# Patient Record
Sex: FEMALE | Race: WHITE | Employment: FULL TIME
[De-identification: names, ages, dates, MRNs, and addresses within clinical notes are randomized per-mention and may not be internally consistent; named-entity substitution may affect disease eponyms.]

---

## 2017-09-17 ENCOUNTER — HEALTH MAINTENANCE LETTER (OUTPATIENT)
Age: 47
End: 2017-09-17

## 2019-04-21 ENCOUNTER — HOSPITAL ENCOUNTER (EMERGENCY)
Facility: CLINIC | Age: 49
Discharge: HOME | End: 2019-04-21
Attending: EMERGENCY MEDICINE | Admitting: EMERGENCY MEDICINE
Payer: COMMERCIAL

## 2019-04-21 ENCOUNTER — APPOINTMENT (OUTPATIENT)
Dept: GENERAL RADIOLOGY | Facility: CLINIC | Age: 49
End: 2019-04-21
Attending: EMERGENCY MEDICINE
Payer: COMMERCIAL

## 2019-04-21 VITALS
RESPIRATION RATE: 16 BRPM | SYSTOLIC BLOOD PRESSURE: 129 MMHG | TEMPERATURE: 98.2 F | WEIGHT: 170 LBS | BODY MASS INDEX: 29.02 KG/M2 | OXYGEN SATURATION: 99 % | HEIGHT: 64 IN | DIASTOLIC BLOOD PRESSURE: 102 MMHG | HEART RATE: 60 BPM

## 2019-04-21 DIAGNOSIS — R07.9 CHEST PAIN, UNSPECIFIED TYPE: ICD-10-CM

## 2019-04-21 LAB
ANION GAP SERPL CALCULATED.3IONS-SCNC: 7 MMOL/L (ref 3–14)
BASOPHILS # BLD AUTO: 0 10E9/L (ref 0–0.2)
BASOPHILS NFR BLD AUTO: 0.4 %
BUN SERPL-MCNC: 17 MG/DL (ref 7–30)
CALCIUM SERPL-MCNC: 8.5 MG/DL (ref 8.5–10.1)
CHLORIDE SERPL-SCNC: 109 MMOL/L (ref 94–109)
CO2 SERPL-SCNC: 25 MMOL/L (ref 20–32)
CREAT SERPL-MCNC: 0.62 MG/DL (ref 0.52–1.04)
DIFFERENTIAL METHOD BLD: NORMAL
EOSINOPHIL # BLD AUTO: 0.2 10E9/L (ref 0–0.7)
EOSINOPHIL NFR BLD AUTO: 3.4 %
ERYTHROCYTE [DISTWIDTH] IN BLOOD BY AUTOMATED COUNT: 12.9 % (ref 10–15)
GFR SERPL CREATININE-BSD FRML MDRD: >90 ML/MIN/{1.73_M2}
GLUCOSE SERPL-MCNC: 88 MG/DL (ref 70–99)
HCT VFR BLD AUTO: 40.4 % (ref 35–47)
HGB BLD-MCNC: 13.8 G/DL (ref 11.7–15.7)
IMM GRANULOCYTES # BLD: 0 10E9/L (ref 0–0.4)
IMM GRANULOCYTES NFR BLD: 0.1 %
INTERPRETATION ECG - MUSE: NORMAL
LYMPHOCYTES # BLD AUTO: 2.7 10E9/L (ref 0.8–5.3)
LYMPHOCYTES NFR BLD AUTO: 37.4 %
MCH RBC QN AUTO: 30.2 PG (ref 26.5–33)
MCHC RBC AUTO-ENTMCNC: 34.2 G/DL (ref 31.5–36.5)
MCV RBC AUTO: 88 FL (ref 78–100)
MONOCYTES # BLD AUTO: 0.5 10E9/L (ref 0–1.3)
MONOCYTES NFR BLD AUTO: 6.3 %
NEUTROPHILS # BLD AUTO: 3.7 10E9/L (ref 1.6–8.3)
NEUTROPHILS NFR BLD AUTO: 52.4 %
NRBC # BLD AUTO: 0 10*3/UL
NRBC BLD AUTO-RTO: 0 /100
PLATELET # BLD AUTO: 196 10E9/L (ref 150–450)
POTASSIUM SERPL-SCNC: 3.9 MMOL/L (ref 3.4–5.3)
RBC # BLD AUTO: 4.57 10E12/L (ref 3.8–5.2)
SODIUM SERPL-SCNC: 141 MMOL/L (ref 133–144)
TROPONIN I SERPL-MCNC: <0.015 UG/L (ref 0–0.04)
TROPONIN I SERPL-MCNC: <0.015 UG/L (ref 0–0.04)
WBC # BLD AUTO: 7.1 10E9/L (ref 4–11)

## 2019-04-21 PROCEDURE — 93005 ELECTROCARDIOGRAM TRACING: CPT

## 2019-04-21 PROCEDURE — 25000132 ZZH RX MED GY IP 250 OP 250 PS 637: Performed by: EMERGENCY MEDICINE

## 2019-04-21 PROCEDURE — 84484 ASSAY OF TROPONIN QUANT: CPT | Performed by: EMERGENCY MEDICINE

## 2019-04-21 PROCEDURE — 85025 COMPLETE CBC W/AUTO DIFF WBC: CPT | Performed by: EMERGENCY MEDICINE

## 2019-04-21 PROCEDURE — 25000125 ZZHC RX 250: Performed by: EMERGENCY MEDICINE

## 2019-04-21 PROCEDURE — 99285 EMERGENCY DEPT VISIT HI MDM: CPT | Mod: 25

## 2019-04-21 PROCEDURE — 80048 BASIC METABOLIC PNL TOTAL CA: CPT | Performed by: EMERGENCY MEDICINE

## 2019-04-21 PROCEDURE — 71046 X-RAY EXAM CHEST 2 VIEWS: CPT

## 2019-04-21 RX ORDER — ASPIRIN 81 MG/1
324 TABLET, CHEWABLE ORAL ONCE
Status: COMPLETED | OUTPATIENT
Start: 2019-04-21 | End: 2019-04-21

## 2019-04-21 RX ADMIN — ASPIRIN 81 MG 324 MG: 81 TABLET ORAL at 10:51

## 2019-04-21 RX ADMIN — LIDOCAINE HYDROCHLORIDE 30 ML: 20 SOLUTION ORAL; TOPICAL at 11:51

## 2019-04-21 ASSESSMENT — ENCOUNTER SYMPTOMS
NAUSEA: 0
VOMITING: 0
FEVER: 0
SHORTNESS OF BREATH: 0
COUGH: 0
DIAPHORESIS: 0

## 2019-04-21 ASSESSMENT — MIFFLIN-ST. JEOR: SCORE: 1371.11

## 2019-04-21 NOTE — ED PROVIDER NOTES
History     Chief Complaint:  Chest Pain     HPI   Maritza Church is a 49 year old female with a history of hyperlipidemia who presents accompanied by her  for evaluation of chest pain. The patient reports that she has had intermittent chest pain for the last several years, and she has an episode of this slightly less frequently than once per month. This morning around 0845, the patient woke up with dull central chest pain that is worse when walking around. This pain does not radiate elsewhere. Since onset, she reports that her pain has been gradually improving. She notes that her episode of pain today was more severe than what she usually experiences with her intermittent chest pain, prompting her to seek evaluation in the ED. Currently in the ED, the patient denies any ongoing chest pain and reports that she is feeling well. She notes that she has also had some bilateral leg swelling recently, but she denies any recent fever, diaphoresis, cough, shortness of breath, nausea, or vomiting. She has no personal or family history of blood clots, has not traveled or had surgery recently, and is not on hormones.     Cardiac Risk Factors:  CAD:    Negative   Hypertension:   Negative   Hyperlipidemia:  Positive   Diabetes:   Negative   Tobacco use:   Negative   Gender:   Female   Age:    49  Familial Hx of CAD:  Negative      Allergies:  NKDA      Medications:    Calcium Carbonate-Vit D-Min (CALTRATE 600+D PLUS) 600-400 MG-UNIT TABS  Multiple Vitamin (DAILY MULTIVITAMIN PO)    Past Medical History:    Hyperlipidemia  Uterine myoma  History of rheumatic fever     Past Surgical History:     section  Echo complete     Family History:    Hypertension - Mother  Lipids - Mother   Diabetes - Mother and father  Lung disease - Father     Social History:  Tobacco use:    Never smoker   Alcohol use:    Positive   Marital status:       Accompanied to ED by:        Review of Systems   Constitutional:  "Negative for diaphoresis and fever.   Respiratory: Negative for cough and shortness of breath.    Cardiovascular: Positive for chest pain and leg swelling (bilateral).   Gastrointestinal: Negative for nausea and vomiting.   All other systems reviewed and are negative.      Physical Exam   First Vitals:  BP: 130/90  Pulse: 59  Temp: 98.2  F (36.8  C)  Resp: 16  Height: 162.6 cm (5' 4\")  Weight: 77.1 kg (170 lb)  SpO2: 99 %    Physical Exam  Constitutional: Alert, attentive, GCS 15  HENT:    Nose: Nose normal.    Mouth/Throat: Oropharynx is clear, mucous membranes are moist.  Eyes: EOM are normal, anicteric, conjugate gaze  CV: regular rate and rhythm; no murmurs  Chest: Effort normal and breath sounds clear without wheezing or rales, symmetric bilaterally   GI:  non tender. No distension. No guarding or rebound.    MSK: No LE edema, no tenderness to palpation of BLE.  Neurological: Alert, attentive, moving all extremities equally.   Skin: Skin is warm and dry.    Emergency Department Course   ECG (10:11:11):  Indication: Screening for cardiovascular disease.   Rate 59 bpm. IN interval 152 ms. QRS duration 94 ms. QT/QTc 412/407 ms. P-R-T axes 39 16 8.   Interpretation: Sinus bradycardia with sinus arrhythmia with occasional premature ventricular complexes, Otherwise normal ECG   Agree with computer interpretation. Yes   Interpreted at 1011 by Dr. Terrazas.      Imaging:  Radiographic findings were communicated with the patient and family who voiced understanding of the findings.    XR Chest:  IMPRESSION: No acute cardiopulmonary disease.  Per radiology.     Laboratory:  CBC: WNL (WBC 7.1, HGB 13.8, )  BMP: WNL (Creatinine 0.62)  Troponin I 1026: <0.015   Troponin I 1215: <0.015      Interventions:  1051 Aspirin 324 mg PO  1151 GI cocktail 30 mL PO     Emergency Department Course:  Nursing notes and vitals reviewed.  1011: I performed an exam of the patient as documented above.     1323: I updated and reassessed " the patient.     Findings and plan explained to the Patient and spouse. Patient discharged home with instructions regarding supportive care, medications, and reasons to return. The importance of close follow-up was reviewed. The patient was prescribed Zantac.      Impression & Plan      Medical Decision Makin-year-old female with no significant past medical history presenting for evaluation of intermittent months that was worse this morning prompting her visit.  She has had constant substernal pain without radiation for 2 hours prior to arrival with no clear exacerbating or alleviating factors.  No family history of early MI she does not use tobacco.  EKG shows no evidence of ischemia.  Troponins negative x2 effectively ruling out ACS with low suspicion for unstable angina.  Her pain was resolved with a GI cocktail after x-ray imaging suggestive of possible GERD which would fit with her pain beginning supine.  She is low risk for PE and has no symptoms of DVT, no hypoxia no tachycardia.  Patient is low risk by [facility] ED ACS and is felt safe for discharge home with plan for outpatient follow-up.  Stress echo was ordered and I recommended she follow-up with her PCP for recheck.  We will initiate her on some Zantac to see if this helps with her discomfort and recommend she return the emergency department should she have worsening chest pain, shortness of breath chest pain associated with exertion.  Patient expressed understanding this plan was discharged home.    Diagnosis:    ICD-10-CM   1. Chest pain, unspecified type R07.9       Disposition:  Discharged to home with Zantac.   Jasvir Terrazas MD   [employer]  5:41 PM 19     Discharge Medications:  ranitidine 150 MG tablet  Commonly known as:  ZANTAC  150 mg, Oral, 2 TIMES DAILY            Joaquin COOPER am serving as a scribe at 10:11 AM on 2019 to document services personally performed by Dr. Terrazas, based on  my observations and the provider's statements to me.     EMERGENCY DEPARTMENT       Jasvir Terrazas MD  04/21/19 2570

## 2019-04-21 NOTE — ED TRIAGE NOTES
CP today, hx of this on/off for the past few years. Today lasting longer. Denies sob or dizziness.

## 2019-04-21 NOTE — ED AVS SNAPSHOT
Emergency Department  [address] [facility] [address]  Phone:  [phone]  Fax:  [phone]                                    Maritza Church   MRN: [id number]    Department:   Emergency Department   Date of Visit:  4/21/2019           After Visit Summary Signature Page    I have received my discharge instructions, and my questions have been answered. I have discussed any challenges I see with this plan with the nurse or doctor.    ..........................................................................................................................................  Patient/Patient Representative Signature      ..........................................................................................................................................  Patient Representative Print Name and Relationship to Patient    ..................................................               ................................................  Date                                   Time    ..........................................................................................................................................  Reviewed by Signature/Title    ...................................................              ..............................................  Date                                               Time          22EPIC Rev 08/18

## 2019-05-02 ENCOUNTER — HOSPITAL ENCOUNTER (OUTPATIENT)
Dept: CARDIOLOGY | Facility: CLINIC | Age: 49
Discharge: HOME | End: 2019-05-02
Attending: EMERGENCY MEDICINE | Admitting: EMERGENCY MEDICINE
Payer: COMMERCIAL

## 2019-05-02 DIAGNOSIS — R07.9 CHEST PAIN, UNSPECIFIED TYPE: ICD-10-CM

## 2019-05-02 PROCEDURE — 93321 DOPPLER ECHO F-UP/LMTD STD: CPT | Mod: 26 | Performed by: INTERNAL MEDICINE

## 2019-05-02 PROCEDURE — 93016 CV STRESS TEST SUPVJ ONLY: CPT | Performed by: INTERNAL MEDICINE

## 2019-05-02 PROCEDURE — 93350 STRESS TTE ONLY: CPT | Mod: 26 | Performed by: INTERNAL MEDICINE

## 2019-05-02 PROCEDURE — 93018 CV STRESS TEST I&R ONLY: CPT | Performed by: INTERNAL MEDICINE

## 2019-05-02 PROCEDURE — 93325 DOPPLER ECHO COLOR FLOW MAPG: CPT | Mod: 26 | Performed by: INTERNAL MEDICINE

## 2019-05-02 PROCEDURE — 93325 DOPPLER ECHO COLOR FLOW MAPG: CPT | Mod: TC

## 2020-02-23 ENCOUNTER — HEALTH MAINTENANCE LETTER (OUTPATIENT)
Age: 50
End: 2020-02-23

## 2022-02-14 ENCOUNTER — HOSPITAL ENCOUNTER (EMERGENCY)
Facility: CLINIC | Age: 52
Discharge: HOME | End: 2022-02-14
Attending: EMERGENCY MEDICINE | Admitting: EMERGENCY MEDICINE
Payer: COMMERCIAL

## 2022-02-14 ENCOUNTER — APPOINTMENT (OUTPATIENT)
Dept: CT IMAGING | Facility: CLINIC | Age: 52
End: 2022-02-14
Attending: EMERGENCY MEDICINE
Payer: COMMERCIAL

## 2022-02-14 VITALS
OXYGEN SATURATION: 100 % | DIASTOLIC BLOOD PRESSURE: 95 MMHG | SYSTOLIC BLOOD PRESSURE: 133 MMHG | RESPIRATION RATE: 17 BRPM | HEART RATE: 80 BPM | TEMPERATURE: 98.1 F

## 2022-02-14 DIAGNOSIS — N20.0 KIDNEY STONE: ICD-10-CM

## 2022-02-14 LAB
ALBUMIN SERPL-MCNC: 3.8 G/DL (ref 3.4–5)
ALBUMIN UR-MCNC: 20 MG/DL
ALP SERPL-CCNC: 104 U/L (ref 40–150)
ALT SERPL W P-5'-P-CCNC: 23 U/L (ref 0–50)
ANION GAP SERPL CALCULATED.3IONS-SCNC: 6 MMOL/L (ref 3–14)
APPEARANCE UR: CLEAR
AST SERPL W P-5'-P-CCNC: 14 U/L (ref 0–45)
BASOPHILS # BLD AUTO: 0.1 10E3/UL (ref 0–0.2)
BASOPHILS NFR BLD AUTO: 1 %
BILIRUB SERPL-MCNC: 0.2 MG/DL (ref 0.2–1.3)
BILIRUB UR QL STRIP: NEGATIVE
BUN SERPL-MCNC: 18 MG/DL (ref 7–30)
CALCIUM SERPL-MCNC: 9.1 MG/DL (ref 8.5–10.1)
CHLORIDE BLD-SCNC: 105 MMOL/L (ref 94–109)
CO2 SERPL-SCNC: 28 MMOL/L (ref 20–32)
COLOR UR AUTO: ABNORMAL
CREAT SERPL-MCNC: 0.84 MG/DL (ref 0.52–1.04)
EOSINOPHIL # BLD AUTO: 0.2 10E3/UL (ref 0–0.7)
EOSINOPHIL NFR BLD AUTO: 1 %
ERYTHROCYTE [DISTWIDTH] IN BLOOD BY AUTOMATED COUNT: 12.5 % (ref 10–15)
GFR SERPL CREATININE-BSD FRML MDRD: 83 ML/MIN/1.73M2
GLUCOSE BLD-MCNC: 115 MG/DL (ref 70–99)
GLUCOSE UR STRIP-MCNC: NEGATIVE MG/DL
HCT VFR BLD AUTO: 42.6 % (ref 35–47)
HGB BLD-MCNC: 14 G/DL (ref 11.7–15.7)
HGB UR QL STRIP: ABNORMAL
IMM GRANULOCYTES # BLD: 0.1 10E3/UL
IMM GRANULOCYTES NFR BLD: 1 %
KETONES UR STRIP-MCNC: NEGATIVE MG/DL
LEUKOCYTE ESTERASE UR QL STRIP: ABNORMAL
LYMPHOCYTES # BLD AUTO: 2.9 10E3/UL (ref 0.8–5.3)
LYMPHOCYTES NFR BLD AUTO: 22 %
MCH RBC QN AUTO: 29.1 PG (ref 26.5–33)
MCHC RBC AUTO-ENTMCNC: 32.9 G/DL (ref 31.5–36.5)
MCV RBC AUTO: 89 FL (ref 78–100)
MONOCYTES # BLD AUTO: 0.6 10E3/UL (ref 0–1.3)
MONOCYTES NFR BLD AUTO: 5 %
MUCOUS THREADS #/AREA URNS LPF: PRESENT /LPF
NEUTROPHILS # BLD AUTO: 9.3 10E3/UL (ref 1.6–8.3)
NEUTROPHILS NFR BLD AUTO: 70 %
NITRATE UR QL: NEGATIVE
NRBC # BLD AUTO: 0 10E3/UL
NRBC BLD AUTO-RTO: 0 /100
PH UR STRIP: 7 [PH] (ref 5–7)
PLATELET # BLD AUTO: 239 10E3/UL (ref 150–450)
POTASSIUM BLD-SCNC: 3.4 MMOL/L (ref 3.4–5.3)
PROT SERPL-MCNC: 7.7 G/DL (ref 6.8–8.8)
RBC # BLD AUTO: 4.81 10E6/UL (ref 3.8–5.2)
RBC URINE: 5 /HPF
SODIUM SERPL-SCNC: 139 MMOL/L (ref 133–144)
SP GR UR STRIP: 1.02 (ref 1–1.03)
SQUAMOUS EPITHELIAL: 1 /HPF
UROBILINOGEN UR STRIP-MCNC: NORMAL MG/DL
WBC # BLD AUTO: 13.1 10E3/UL (ref 4–11)
WBC URINE: 7 /HPF

## 2022-02-14 PROCEDURE — 85025 COMPLETE CBC W/AUTO DIFF WBC: CPT | Performed by: EMERGENCY MEDICINE

## 2022-02-14 PROCEDURE — 96374 THER/PROPH/DIAG INJ IV PUSH: CPT

## 2022-02-14 PROCEDURE — 80053 COMPREHEN METABOLIC PANEL: CPT | Performed by: EMERGENCY MEDICINE

## 2022-02-14 PROCEDURE — 82040 ASSAY OF SERUM ALBUMIN: CPT | Performed by: EMERGENCY MEDICINE

## 2022-02-14 PROCEDURE — 258N000003 HC RX IP 258 OP 636: Performed by: EMERGENCY MEDICINE

## 2022-02-14 PROCEDURE — 74176 CT ABD & PELVIS W/O CONTRAST: CPT

## 2022-02-14 PROCEDURE — 36415 COLL VENOUS BLD VENIPUNCTURE: CPT | Performed by: EMERGENCY MEDICINE

## 2022-02-14 PROCEDURE — 81001 URINALYSIS AUTO W/SCOPE: CPT | Performed by: EMERGENCY MEDICINE

## 2022-02-14 PROCEDURE — 96361 HYDRATE IV INFUSION ADD-ON: CPT

## 2022-02-14 PROCEDURE — 250N000011 HC RX IP 250 OP 636: Performed by: EMERGENCY MEDICINE

## 2022-02-14 PROCEDURE — 99285 EMERGENCY DEPT VISIT HI MDM: CPT | Mod: 25

## 2022-02-14 PROCEDURE — 96375 TX/PRO/DX INJ NEW DRUG ADDON: CPT

## 2022-02-14 RX ORDER — ONDANSETRON 4 MG/1
4 TABLET, ORALLY DISINTEGRATING ORAL EVERY 8 HOURS PRN
Qty: 10 TABLET | Refills: 0 | Status: SHIPPED | OUTPATIENT
Start: 2022-02-14 | End: 2022-02-17

## 2022-02-14 RX ORDER — OXYCODONE HYDROCHLORIDE 5 MG/1
5 TABLET ORAL EVERY 6 HOURS PRN
Qty: 6 TABLET | Refills: 0 | Status: SHIPPED | OUTPATIENT
Start: 2022-02-14 | End: 2022-02-17

## 2022-02-14 RX ORDER — KETOROLAC TROMETHAMINE 15 MG/ML
10 INJECTION, SOLUTION INTRAMUSCULAR; INTRAVENOUS ONCE
Status: COMPLETED | OUTPATIENT
Start: 2022-02-14 | End: 2022-02-14

## 2022-02-14 RX ORDER — ONDANSETRON 2 MG/ML
4 INJECTION INTRAMUSCULAR; INTRAVENOUS EVERY 30 MIN PRN
Status: DISCONTINUED | OUTPATIENT
Start: 2022-02-14 | End: 2022-02-14 | Stop reason: HOSPADM

## 2022-02-14 RX ORDER — SODIUM CHLORIDE, SODIUM LACTATE, POTASSIUM CHLORIDE, CALCIUM CHLORIDE 600; 310; 30; 20 MG/100ML; MG/100ML; MG/100ML; MG/100ML
125 INJECTION, SOLUTION INTRAVENOUS CONTINUOUS
Status: DISCONTINUED | OUTPATIENT
Start: 2022-02-14 | End: 2022-02-14 | Stop reason: HOSPADM

## 2022-02-14 RX ADMIN — KETOROLAC TROMETHAMINE 10 MG: 15 INJECTION, SOLUTION INTRAMUSCULAR; INTRAVENOUS at 05:07

## 2022-02-14 RX ADMIN — ONDANSETRON 4 MG: 2 INJECTION INTRAMUSCULAR; INTRAVENOUS at 05:02

## 2022-02-14 RX ADMIN — SODIUM CHLORIDE, POTASSIUM CHLORIDE, SODIUM LACTATE AND CALCIUM CHLORIDE 1000 ML: 600; 310; 30; 20 INJECTION, SOLUTION INTRAVENOUS at 05:09

## 2022-02-14 ASSESSMENT — ENCOUNTER SYMPTOMS
CONSTIPATION: 0
DYSURIA: 0
NAUSEA: 1
DIARRHEA: 0
ABDOMINAL PAIN: 1
DIFFICULTY URINATING: 0
VOMITING: 1
HEMATURIA: 0
FEVER: 0
SHORTNESS OF BREATH: 0

## 2022-02-14 NOTE — ED PROVIDER NOTES
History   Chief Complaint:  Abdominal Pain (LLQ pain started Saturday with N/V. Denies urinary symptoms.)       The history is provided by the patient.      Maritza Church is a 51 year old female with history of hyperlipidemia and uterine myoma who presents with LLQ abdominal pain. She first developed the pain 2 days and it resolved initially. It returned yesterday afternoon, but it worsened this morning at around 0300. At bedside, she rates the pain as a 9/10. Other symptoms mentioned include nausea and 1 episode of vomiting this morning. She took a dose of Tylenol this morning at 0330. Prior abdominal surgeries include  section. She denies recent alcohol or tobacco use. She also denies fever, difficulty urinating, dysuria, hematuria, diarrhea, or constipation.     Review of Systems   Constitutional: Negative for fever.   Respiratory: Negative for shortness of breath.    Cardiovascular: Negative for chest pain.   Gastrointestinal: Positive for abdominal pain, nausea and vomiting. Negative for constipation and diarrhea.   Genitourinary: Negative for difficulty urinating, dysuria and hematuria.   All other systems reviewed and are negative.    Allergies:  No Known Allergies    Medications:  Patient denies current use of medications.     Past Medical History:     Rheumatic fever   Mixed HLD   Uterine myoma     Past Surgical History:          Family History:    Mother - HTN, lipids, DM  Father - Type 2 Dm, Lung disease    Social History:  Patient presents to the ED alone  Hx of alcohol use     Physical Exam     Patient Vitals for the past 24 hrs:   BP Temp Temp src Pulse Resp SpO2   22 0633 -- -- -- -- -- 100 %   22 0632 (!) 133/95 98.1  F (36.7  C) -- 80 17 100 %   22 0445 (!) 155/93 98.1  F (36.7  C) Oral 75 18 100 %       Physical Exam  General: Appears uncomfortable  Head: No signs of trauma.   CV: Normal rate and regular rhythm.    Resp: Effort normal and breath sounds  normal. No respiratory distress.   GI: Soft. There is no tenderness.  No rebound or guarding.  Normal bowel sounds.  No CVA tenderness.  MSK: Normal range of motion.  Neuro: The patient is alert and oriented. Speech normal.  Skin: Skin is warm and dry. No rash noted.   Psych: normal mood and affect. behavior is normal.       Emergency Department Course   Imaging:  CT Abdomen Pelvis w/o Contrast  Final Result  IMPRESSION:   1.  2 to 3 mm obstructing left ureterovesical junction calculus.    Report per radiology    Laboratory:  Labs Ordered and Resulted from Time of ED Arrival to Time of ED Departure   COMPREHENSIVE METABOLIC PANEL - Abnormal       Result Value    Sodium 139      Potassium 3.4      Chloride 105      Carbon Dioxide (CO2) 28      Anion Gap 6      Urea Nitrogen 18      Creatinine 0.84      Calcium 9.1      Glucose 115 (*)     Alkaline Phosphatase 104      AST 14      ALT 23      Protein Total 7.7      Albumin 3.8      Bilirubin Total 0.2      GFR Estimate 83     ROUTINE UA WITH MICROSCOPIC REFLEX TO CULTURE - Abnormal    Color Urine Light Yellow      Appearance Urine Clear      Glucose Urine Negative      Bilirubin Urine Negative      Ketones Urine Negative      Specific Gravity Urine 1.021      Blood Urine Small (*)     pH Urine 7.0      Protein Albumin Urine 20  (*)     Urobilinogen Urine Normal      Nitrite Urine Negative      Leukocyte Esterase Urine Small (*)     Mucus Urine Present (*)     RBC Urine 5 (*)     WBC Urine 7 (*)     Squamous Epithelials Urine 1     CBC WITH PLATELETS AND DIFFERENTIAL - Abnormal    WBC Count 13.1 (*)     RBC Count 4.81      Hemoglobin 14.0      Hematocrit 42.6      MCV 89      MCH 29.1      MCHC 32.9      RDW 12.5      Platelet Count 239      % Neutrophils 70      % Lymphocytes 22      % Monocytes 5      % Eosinophils 1      % Basophils 1      % Immature Granulocytes 1      NRBCs per 100 WBC 0      Absolute Neutrophils 9.3 (*)     Absolute Lymphocytes 2.9      Absolute  Monocytes 0.6      Absolute Eosinophils 0.2      Absolute Basophils 0.1      Absolute Immature Granulocytes 0.1      Absolute NRBCs 0.0        Emergency Department Course:    Reviewed:  I reviewed nursing notes, vitals, past medical history and Care Everywhere    Assessments:  0448 I obtained history and examined the patient as noted above.   0624 I rechecked the patient and explained findings. I discussed plan for discharge home.    Interventions:  0502 Zofran 4 mg IV   0507 Toradol 10 mg IV   0509 Lactated ringers 1 L IV     Disposition:  The patient was discharged to home.     Impression & Plan   Medical Decision Making:  Marizta Church is a 51-year-old woman events due to left sided abdominal pain.  She had some pain then got better and returned this evening prompting her to come to the hospital.  Evaluation she appeared uncomfortable but her exam was reassuring.  CT scan did show kidney stone was to be consistent with her symptoms.  There is no clinical signs for infection.  Patient received Toradol and did feel considerably better.  She was discharged home with recommendation for supportive care and instructed to follow-up with her doctor and to return to the hospital for any signs of infection or further concerns.    Diagnosis:    ICD-10-CM    1. Kidney stone  N20.0        Discharge Medications:  New Prescriptions    ONDANSETRON (ZOFRAN ODT) 4 MG ODT TAB    Take 1 tablet (4 mg) by mouth every 8 hours as needed    OXYCODONE (ROXICODONE) 5 MG TABLET    Take 1 tablet (5 mg) by mouth every 6 hours as needed for severe pain       Scribe Disclosure:  I, Sen Alatorre, am serving as a scribe at 4:48 AM on 2/14/2022 to document services personally performed by Jasvir Fernandez MD based on my observations and the provider's statements to me.        Jasvir Fernandez MD  02/14/22 0807

## 2022-02-14 NOTE — ED NOTES
Patient ready for discharge home per ED MD orders. Given AVS and verbalized understanding. Ambulated out of ED independently, steady gait.  driving patient home.